# Patient Record
Sex: FEMALE | Race: OTHER | URBAN - METROPOLITAN AREA
[De-identification: names, ages, dates, MRNs, and addresses within clinical notes are randomized per-mention and may not be internally consistent; named-entity substitution may affect disease eponyms.]

---

## 2017-06-25 ENCOUNTER — EMERGENCY (EMERGENCY)
Facility: HOSPITAL | Age: 32
LOS: 1 days | Discharge: HOME | End: 2017-06-25
Attending: EMERGENCY MEDICINE | Admitting: EMERGENCY MEDICINE
Payer: COMMERCIAL

## 2017-06-25 VITALS
HEART RATE: 79 BPM | SYSTOLIC BLOOD PRESSURE: 121 MMHG | OXYGEN SATURATION: 99 % | DIASTOLIC BLOOD PRESSURE: 79 MMHG | TEMPERATURE: 98 F | RESPIRATION RATE: 16 BRPM

## 2017-06-25 DIAGNOSIS — Z79.899 OTHER LONG TERM (CURRENT) DRUG THERAPY: ICD-10-CM

## 2017-06-25 DIAGNOSIS — S30.0XXA CONTUSION OF LOWER BACK AND PELVIS, INITIAL ENCOUNTER: ICD-10-CM

## 2017-06-25 DIAGNOSIS — M54.5 LOW BACK PAIN: ICD-10-CM

## 2017-06-25 PROCEDURE — 72100 X-RAY EXAM L-S SPINE 2/3 VWS: CPT | Mod: 26

## 2017-06-25 PROCEDURE — 99284 EMERGENCY DEPT VISIT MOD MDM: CPT | Mod: 25

## 2017-06-25 RX ORDER — METHOCARBAMOL 500 MG/1
750 TABLET, FILM COATED ORAL ONCE
Qty: 0 | Refills: 0 | Status: COMPLETED | OUTPATIENT
Start: 2017-06-25 | End: 2017-06-25

## 2017-06-25 RX ORDER — SPIRONOLACTONE 25 MG/1
0 TABLET, FILM COATED ORAL
Qty: 0 | Refills: 0 | COMMUNITY

## 2017-06-25 RX ORDER — METHOCARBAMOL 500 MG/1
2 TABLET, FILM COATED ORAL
Qty: 30 | Refills: 0 | OUTPATIENT
Start: 2017-06-25 | End: 2017-06-30

## 2017-06-25 RX ORDER — KETOROLAC TROMETHAMINE 30 MG/ML
60 SYRINGE (ML) INJECTION ONCE
Qty: 0 | Refills: 0 | Status: DISCONTINUED | OUTPATIENT
Start: 2017-06-25 | End: 2017-06-25

## 2017-06-25 RX ADMIN — METHOCARBAMOL 750 MILLIGRAM(S): 500 TABLET, FILM COATED ORAL at 18:54

## 2017-06-25 RX ADMIN — Medication 60 MILLIGRAM(S): at 18:12

## 2017-06-25 RX ADMIN — Medication 60 MILLIGRAM(S): at 18:54

## 2017-06-25 NOTE — ED PROVIDER NOTE - OBJECTIVE STATEMENT
32y F Pt with h/o L5-s1 disc herniation presents to ED with pain to left lower back s/p fall onto buttocks today. Pain radiates down left leg to knee. Associated head trauma with no LOC. 32y F Pt with h/o L5-s1 disc herniation presents to ED with pain to left lower back s/p fall onto buttocks today. Pain radiates down left leg to knee. Associated difficulty bearing weight and head trauma with no LOC.

## 2017-06-25 NOTE — ED PROVIDER NOTE - DIAGNOSTIC INTERPRETATION
Interpreted by MD  lumbar-spine x-ray, AP + lateral views  grade 1 spondylolisthesis at l5-s1, no dislocations, no soft tissue swelling

## 2017-06-25 NOTE — ED PROVIDER NOTE - MUSCULOSKELETAL, MLM
SI joint tenderness on the left, increased tenderness to piriformis, increased para-lumbar tenderness on the left.

## 2017-06-26 NOTE — ED POST DISCHARGE NOTE - ADDITIONAL DOCUMENTATION
Spoke with patient after she confirmed her full name, date of birth, address and date seen. Instructed her to call the radiology department as her x-rays, nor the written report are available for viewing. Provided patient with the number to the radiology department so she may get her x-rays.

## 2022-01-28 NOTE — ED ADULT NURSE NOTE - GASTROINTESTINAL ASSESSMENT
Quality 431: Preventive Care And Screening: Unhealthy Alcohol Use - Screening: Patient not identified as an unhealthy alcohol user when screened for unhealthy alcohol use using a systematic screening method Detail Level: Detailed Quality 226: Preventive Care And Screening: Tobacco Use: Screening And Cessation Intervention: Patient screened for tobacco use and is an ex/non-smoker Quality 110: Preventive Care And Screening: Influenza Immunization: Influenza Immunization Administered during Influenza season Quality 111:Pneumonia Vaccination Status For Older Adults: Pneumococcal Vaccination Previously Received Quality 130: Documentation Of Current Medications In The Medical Record: Current Medications Documented WDL

## 2024-07-03 NOTE — ED POST DISCHARGE NOTE - REASON FOR FOLLOW-UP
Other Patient's insurance no longer covers Tresiba.   Patient is completely out of insulin and has none to take tonight.     Per CoverMyMeds the following insulins are covered by insurance and do NOT require a PA. Please send on of the following to replace the Tresiba:    Humalog Mix 50/50 KwikPen   Humalog Mix 75/25 KwikPen   Insulin Glargine   Insulin glargine U-300 Max Solostar   Insulin glargine U-300 Solostar   Lantus Solostar   Levemir FlexTouch Pen      Patient called on 6/26/17 @ 12pm and is requesting a copy of her xrays.

## 2025-07-31 NOTE — ED PROVIDER NOTE - ENMT NEGATIVE STATEMENT, MLM
Please let her know the LDL was 104 which still remains greater than the goal of less than 55.  Therefore, I would recommend increasing the rosuvastatin to 40 mg.  Please ensure that she is not also taking pravastatin which remains on her medication list.   no epistaxis